# Patient Record
Sex: MALE | Race: WHITE | URBAN - METROPOLITAN AREA
[De-identification: names, ages, dates, MRNs, and addresses within clinical notes are randomized per-mention and may not be internally consistent; named-entity substitution may affect disease eponyms.]

---

## 2023-04-24 ENCOUNTER — TELEPHONE (OUTPATIENT)
Dept: PRIMARY CARE | Facility: CLINIC | Age: 63
End: 2023-04-24
Payer: COMMERCIAL

## 2023-04-27 ENCOUNTER — TELEMEDICINE (OUTPATIENT)
Dept: PRIMARY CARE | Facility: CLINIC | Age: 63
End: 2023-04-27
Payer: COMMERCIAL

## 2023-04-27 DIAGNOSIS — I10 BENIGN ESSENTIAL HYPERTENSION: ICD-10-CM

## 2023-04-27 DIAGNOSIS — K04.7 TOOTH INFECTION: ICD-10-CM

## 2023-04-27 DIAGNOSIS — E66.09 OBESITY DUE TO EXCESS CALORIES WITHOUT SERIOUS COMORBIDITY, UNSPECIFIED CLASSIFICATION: ICD-10-CM

## 2023-04-27 DIAGNOSIS — I51.9 HEART DISEASE: Primary | ICD-10-CM

## 2023-04-27 PROCEDURE — 99212 OFFICE O/P EST SF 10 MIN: CPT | Performed by: INTERNAL MEDICINE

## 2023-04-27 RX ORDER — AMOXICILLIN 500 MG/1
500 CAPSULE ORAL EVERY 8 HOURS SCHEDULED
Qty: 30 CAPSULE | Refills: 0 | Status: SHIPPED | OUTPATIENT
Start: 2023-04-27 | End: 2023-05-07

## 2023-04-27 RX ORDER — AMOXICILLIN 500 MG/1
500 CAPSULE ORAL EVERY 8 HOURS SCHEDULED
Qty: 30 CAPSULE | Refills: 0 | Status: SHIPPED | OUTPATIENT
Start: 2023-04-27 | End: 2023-04-27 | Stop reason: SDUPTHER

## 2023-04-27 NOTE — PROGRESS NOTES
Patient ID: Reji Bolanos is a 62 y.o. male who presents for No chief complaint on file..    Assessment/Plan     Problem List Items Addressed This Visit    None      Source of history: Nurse, Medical personnel, Medical record, Patient.  History limitation: None.      HPI  62-year-old patient have a heart disease going for dental appointment very about infection sepsis bacteremia advised salt water gargle amoxicillin before and after dental procedure    Negative for chest pain headache    Negative for nausea vomiting diarrhea constipation  Not on File    Medications    No current outpatient medications on file.     No current facility-administered medications for this visit.       Objective   Visit Vitals  Smoking Status Never       .Doxy  This visit was completed via video audio relation to  covid 19 pandemic all issues as below that discuss and address but no physical exam was performed if it was felt that patient should be evaluated in clinic and they have been advised to follow . Patient verbally consented to visit and spent  more than 50% discuss about patient's complaint of problem and plan        ROS  Constitutional: Denies fevers, chills, fatigue, weight loss/gain  HEENT: Denies HA, vision changes, hearing loss, sore throat  Cardiac: Denies CP, palpitations, edema  Respiratory: Denies SOB, cough, pleuritic chest pain, PND, orthopnea  GI: Denies N/V/D, abd pain, constipation, black/bloody stools  : Denies urinary changes, frequency, hematuria, urgency, retention, flank pain  MSK: Denies joint pain, joint swelling, back pain, neck pain, extremity pain  Neuro: Denies numbness, weakness, tingling    Immunization History   Administered Date(s) Administered   • Pfizer Purple Cap SARS-CoV-2 11/02/2021, 11/26/2021       No visits with results within 4 Month(s) from this visit.   Latest known visit with results is:   Legacy Encounter on 10/29/2022   Component Date Value Ref Range Status   • SARS-COV-2 RESULT  10/29/2022 NOT DETECTED  Not Detected Final       Radiology: Reviewed imaging in powerchart.  No results found.    No family history on file.  Social History     Socioeconomic History   • Marital status:      Spouse name: None   • Number of children: None   • Years of education: None   • Highest education level: None   Occupational History   • None   Tobacco Use   • Smoking status: Never   • Smokeless tobacco: Never   Vaping Use   • Vaping status: None   Substance and Sexual Activity   • Alcohol use: Not Currently   • Drug use: Not Currently   • Sexual activity: None   Other Topics Concern   • None   Social History Narrative   • None     Social Determinants of Health     Financial Resource Strain: Not on file   Food Insecurity: Not on file   Transportation Needs: Not on file   Physical Activity: Not on file   Stress: Not on file   Social Connections: Not on file   Intimate Partner Violence: Not on file   Housing Stability: Not on file     Past Medical History:   Diagnosis Date   • Acute upper respiratory infection, unspecified 10/29/2022    Viral URI with cough   • Allergic rhinitis, unspecified 01/17/2020    Allergic sinusitis   • Cellulitis of left lower limb 06/06/2017    Cellulitis of left leg without foot   • Impacted cerumen, unspecified ear 06/26/2014    Wax in ear   • Other conditions influencing health status 01/17/2020    History of cough   • Personal history of diseases of the skin and subcutaneous tissue 01/17/2020    History of eczema   • Personal history of other diseases of the circulatory system 11/30/2016    History of hypertension   • Personal history of other endocrine, nutritional and metabolic disease 11/11/2021    History of obesity   • Personal history of other endocrine, nutritional and metabolic disease 01/17/2020    History of vitamin D deficiency   • Personal history of other specified conditions     History of fever     Past Surgical History:   Procedure Laterality Date   • OTHER  SURGICAL HISTORY  06/26/2014    Heart Valve Replacement     * Cannot find OR log *    Charting was completed using voice recognition technology and may include unintended errors.

## 2023-05-04 ENCOUNTER — APPOINTMENT (OUTPATIENT)
Dept: PRIMARY CARE | Facility: CLINIC | Age: 63
End: 2023-05-04
Payer: COMMERCIAL

## 2023-06-21 ENCOUNTER — TELEPHONE (OUTPATIENT)
Dept: PRIMARY CARE | Facility: CLINIC | Age: 63
End: 2023-06-21
Payer: COMMERCIAL

## 2023-06-21 DIAGNOSIS — I10 BENIGN ESSENTIAL HYPERTENSION: ICD-10-CM

## 2023-06-22 RX ORDER — BENAZEPRIL HYDROCHLORIDE AND HYDROCHLOROTHIAZIDE 10; 12.5 MG/1; MG/1
1 TABLET ORAL
Qty: 30 TABLET | Refills: 0 | Status: SHIPPED | OUTPATIENT
Start: 2023-06-22 | End: 2023-08-11 | Stop reason: SDUPTHER

## 2023-08-11 ENCOUNTER — TELEPHONE (OUTPATIENT)
Dept: PRIMARY CARE | Facility: CLINIC | Age: 63
End: 2023-08-11
Payer: COMMERCIAL

## 2023-08-11 DIAGNOSIS — I10 BENIGN ESSENTIAL HYPERTENSION: ICD-10-CM

## 2023-08-11 RX ORDER — BENAZEPRIL HYDROCHLORIDE AND HYDROCHLOROTHIAZIDE 10; 12.5 MG/1; MG/1
1 TABLET ORAL
Qty: 30 TABLET | Refills: 0 | Status: SHIPPED | OUTPATIENT
Start: 2023-08-11 | End: 2023-11-16 | Stop reason: SDUPTHER

## 2023-11-16 DIAGNOSIS — I10 BENIGN ESSENTIAL HYPERTENSION: ICD-10-CM

## 2023-11-16 RX ORDER — BENAZEPRIL HYDROCHLORIDE AND HYDROCHLOROTHIAZIDE 10; 12.5 MG/1; MG/1
1 TABLET ORAL
Qty: 30 TABLET | Refills: 1 | Status: SHIPPED | OUTPATIENT
Start: 2023-11-16 | End: 2024-01-12 | Stop reason: SDUPTHER

## 2024-01-12 ENCOUNTER — OFFICE VISIT (OUTPATIENT)
Dept: PRIMARY CARE | Facility: CLINIC | Age: 64
End: 2024-01-12
Payer: COMMERCIAL

## 2024-01-12 VITALS
BODY MASS INDEX: 28.81 KG/M2 | DIASTOLIC BLOOD PRESSURE: 68 MMHG | HEART RATE: 84 BPM | OXYGEN SATURATION: 99 % | TEMPERATURE: 97.8 F | HEIGHT: 71 IN | SYSTOLIC BLOOD PRESSURE: 110 MMHG | WEIGHT: 205.8 LBS

## 2024-01-12 DIAGNOSIS — E66.09 OBESITY DUE TO EXCESS CALORIES WITHOUT SERIOUS COMORBIDITY, UNSPECIFIED CLASSIFICATION: ICD-10-CM

## 2024-01-12 DIAGNOSIS — I83.891 VARICOSE VEINS OF RIGHT LEG WITH EDEMA: ICD-10-CM

## 2024-01-12 DIAGNOSIS — Z00.00 MEDICARE ANNUAL WELLNESS VISIT, SUBSEQUENT: Primary | ICD-10-CM

## 2024-01-12 DIAGNOSIS — I10 BENIGN ESSENTIAL HYPERTENSION: ICD-10-CM

## 2024-01-12 DIAGNOSIS — I10 HYPERTENSION, UNSPECIFIED TYPE: ICD-10-CM

## 2024-01-12 DIAGNOSIS — I51.9 HEART DISEASE: ICD-10-CM

## 2024-01-12 DIAGNOSIS — K04.7 TOOTH INFECTION: ICD-10-CM

## 2024-01-12 LAB
NON-UH HIE A/G RATIO: 1.2
NON-UH HIE ALB: 4.1 G/DL (ref 3.4–5)
NON-UH HIE ALK PHOS: 81 UNIT/L (ref 45–117)
NON-UH HIE APPEARANCE, U: ABNORMAL
NON-UH HIE BACTERIA, U: ABNORMAL
NON-UH HIE BASO COUNT: 0.04 X1000 (ref 0–0.2)
NON-UH HIE BASOS %: 0.6 %
NON-UH HIE BILIRUBIN, TOTAL: 0.7 MG/DL (ref 0.3–1.2)
NON-UH HIE BILIRUBIN, U: NEGATIVE
NON-UH HIE BLOOD, U: NEGATIVE
NON-UH HIE BUN/CREAT RATIO: 22
NON-UH HIE BUN: 22 MG/DL (ref 9–23)
NON-UH HIE CALCIUM: 9.8 MG/DL (ref 8.7–10.4)
NON-UH HIE CALCULATED LDL CHOLESTEROL: 84 MG/DL (ref 60–130)
NON-UH HIE CALCULATED OSMOLALITY: 275 MOSM/KG (ref 275–295)
NON-UH HIE CHLORIDE: 102 MMOL/L (ref 98–107)
NON-UH HIE CHOLESTEROL: 151 MG/DL (ref 100–200)
NON-UH HIE CO2, VENOUS: 27 MMOL/L (ref 20–31)
NON-UH HIE COLOR, U: YELLOW
NON-UH HIE CREATININE, URINE MG/DL: 163.1 MG/DL
NON-UH HIE CREATININE: 1 MG/DL (ref 0.6–1.1)
NON-UH HIE DIFF?: NO
NON-UH HIE DXH ACTIONS: ABNORMAL
NON-UH HIE EOS COUNT: 0.29 X1000 (ref 0–0.5)
NON-UH HIE EOSIN %: 4.1 %
NON-UH HIE GFR AA: >60
NON-UH HIE GLOBULIN: 3.4 G/DL
NON-UH HIE GLOMERULAR FILTRATION RATE: >60 ML/MIN/1.73M?
NON-UH HIE GLUCOSE QUAL, U: NEGATIVE
NON-UH HIE GLUCOSE: 97 MG/DL (ref 74–106)
NON-UH HIE GOT: 23 UNIT/L (ref 15–37)
NON-UH HIE GPT: 15 UNIT/L (ref 10–49)
NON-UH HIE HCT: 37 % (ref 41–52)
NON-UH HIE HDL CHOLESTEROL: 55 MG/DL (ref 40–60)
NON-UH HIE HEM PATH REVIEW: ABNORMAL
NON-UH HIE HEPATITIS C ANTIBODY: NONREACTIVE
NON-UH HIE HGB A1C: 5.5 %
NON-UH HIE HGB: 11.2 G/DL (ref 13.5–17.5)
NON-UH HIE HIV  PANEL 1: NONREACTIVE
NON-UH HIE INSTR WBC: 7.2
NON-UH HIE K: 4.5 MMOL/L (ref 3.5–5.1)
NON-UH HIE KETONES, U: NEGATIVE
NON-UH HIE LEUKOCYTE ESTERASE, U: ABNORMAL
NON-UH HIE LYMPH %: 17.6 %
NON-UH HIE LYMPH COUNT: 1.26 X1000 (ref 1.2–4.8)
NON-UH HIE MAGNESIUM: 2 MG/DL (ref 1.6–2.6)
NON-UH HIE MCH: 18.9 PG (ref 27–34)
NON-UH HIE MCHC: 30.2 G/DL (ref 32–37)
NON-UH HIE MCV: 62.6 FL (ref 80–100)
NON-UH HIE MICROALBUMIN, URINE MG/L: <3 MG/L
NON-UH HIE MICROALBUMIN/CREATININE RATIO: <2 MG MALB/GM CREAT (ref 0–30)
NON-UH HIE MONO %: 8.4 %
NON-UH HIE MONO COUNT: 0.6 X1000 (ref 0.1–1)
NON-UH HIE MPV: 8.5 FL (ref 7.4–10.4)
NON-UH HIE MUCOUS, U: ABNORMAL
NON-UH HIE NA: 136 MMOL/L (ref 135–145)
NON-UH HIE NEUTROPHIL %: 69.3 %
NON-UH HIE NEUTROPHIL COUNT (ANC): 4.98 X1000 (ref 1.4–8.8)
NON-UH HIE NITRITE, U: NEGATIVE
NON-UH HIE NUCLEATED RBC: 0 /100WBC
NON-UH HIE PH, U: 7 (ref 4.5–8)
NON-UH HIE PLATELET: 358 X10 (ref 150–450)
NON-UH HIE PROSTATIC SPECIFIC AG SCREEN: 4.7 NG/ML (ref 0–4)
NON-UH HIE PROTEIN, U: NEGATIVE
NON-UH HIE RBC/HPF, U: 1 #/HPF (ref 0–3)
NON-UH HIE RBC: 5.91 X10 (ref 4.7–6.1)
NON-UH HIE RDW: 22.2 % (ref 11.5–14.5)
NON-UH HIE RED BLOOD CELL MORPHOLOGY: ABNORMAL
NON-UH HIE SCAN DIFFERENTIAL: ABNORMAL
NON-UH HIE SPECIFIC GRAVITY, U: 1.02 (ref 1–1.03)
NON-UH HIE SQUAMOUS EPITHELIAL CELLS, U: 4 #/HPF
NON-UH HIE TOTAL CHOL/HDL CHOL RATIO: 2.7
NON-UH HIE TOTAL PROTEIN: 7.5 G/DL (ref 5.7–8.2)
NON-UH HIE TRIGLYCERIDES: 62 MG/DL (ref 30–150)
NON-UH HIE TSH: 1.34 UIU/ML (ref 0.55–4.78)
NON-UH HIE U MICRO: ABNORMAL
NON-UH HIE URIC ACID: 7.1 MG/DL (ref 3.7–9.2)
NON-UH HIE UROBILINOGEN QUAL, U: ABNORMAL
NON-UH HIE WBC/HPF, U: 5 #/HPF (ref 0–5)
NON-UH HIE WBC: 7.2 X10 (ref 4.5–11)

## 2024-01-12 PROCEDURE — 99396 PREV VISIT EST AGE 40-64: CPT | Performed by: INTERNAL MEDICINE

## 2024-01-12 PROCEDURE — 3078F DIAST BP <80 MM HG: CPT | Performed by: INTERNAL MEDICINE

## 2024-01-12 PROCEDURE — 3074F SYST BP LT 130 MM HG: CPT | Performed by: INTERNAL MEDICINE

## 2024-01-12 PROCEDURE — 1036F TOBACCO NON-USER: CPT | Performed by: INTERNAL MEDICINE

## 2024-01-12 PROCEDURE — 96127 BRIEF EMOTIONAL/BEHAV ASSMT: CPT | Performed by: INTERNAL MEDICINE

## 2024-01-12 RX ORDER — CHOLECALCIFEROL (VITAMIN D3) 25 MCG
1000 TABLET ORAL
COMMUNITY
Start: 2016-11-30

## 2024-01-12 RX ORDER — BENAZEPRIL HYDROCHLORIDE AND HYDROCHLOROTHIAZIDE 10; 12.5 MG/1; MG/1
1 TABLET ORAL
Qty: 90 TABLET | Refills: 3 | Status: SHIPPED | OUTPATIENT
Start: 2024-01-12 | End: 2024-04-30 | Stop reason: SDUPTHER

## 2024-01-12 RX ORDER — ASPIRIN 81 MG/1
TABLET ORAL
COMMUNITY
Start: 2017-11-13

## 2024-01-12 ASSESSMENT — PATIENT HEALTH QUESTIONNAIRE - PHQ9
2. FEELING DOWN, DEPRESSED OR HOPELESS: NOT AT ALL
1. LITTLE INTEREST OR PLEASURE IN DOING THINGS: NOT AT ALL
SUM OF ALL RESPONSES TO PHQ9 QUESTIONS 1 AND 2: 0

## 2024-01-12 NOTE — PROGRESS NOTES
Assessment and Plan:  Obesity BMI 28 diet and exercise  Hypertension lisinopril 10/12.5 BMP once a year  Hyperlipidemia low-fat diet  Osteopenia osteoarthritis vitamin C vitamin D calcium supplement  Problem List Items Addressed This Visit       RESOLVED: Tooth infection    Relevant Orders    HIV 1/2 Antigen/Antibody Screen with Reflex to Confirmation    Hepatitis C antibody    Albumin , Urine Random    CBC and Auto Differential    Comprehensive Metabolic Panel    Hemoglobin A1C    Lipid Panel    Magnesium    Prostate Specific Antigen, Screen    TSH with reflex to Free T4 if abnormal    Uric Acid    Microscopic Only, Urine    Colonoscopy Screening; Average Risk Patient    Heart disease    Relevant Orders    HIV 1/2 Antigen/Antibody Screen with Reflex to Confirmation    Hepatitis C antibody    Albumin , Urine Random    CBC and Auto Differential    Comprehensive Metabolic Panel    Hemoglobin A1C    Lipid Panel    Magnesium    Prostate Specific Antigen, Screen    TSH with reflex to Free T4 if abnormal    Uric Acid    Microscopic Only, Urine    Colonoscopy Screening; Average Risk Patient    Benign essential hypertension     Patients BP readings reviewed and addressed, as we age our arteries turn stiffer and less elastic. Restricting salt consumption and staying physically fit with regular exercise regimen is the only way to keep our vasculature less tonic. Studies have shown that keeping ideal body wt, exercise routine about 140 to 150 minutes a week, eating variety of plant based diet and drinking plentiful water are quite helpful. Monitor BP twice or once a week at home and bring log to be reviewed by me. Uncontrolled BP has long term consequences including heart failure, myocardial infarction, accelerated atherosclerosis and kidney dysfunction. Therapy reviewed and explained.           Relevant Medications    benazepriL-hydrochlorothiazide (Lotensin HCT) 10-12.5 mg tablet    Other Relevant Orders    HIV 1/2  Antigen/Antibody Screen with Reflex to Confirmation    Hepatitis C antibody    Albumin , Urine Random    CBC and Auto Differential    Comprehensive Metabolic Panel    Hemoglobin A1C    Lipid Panel    Magnesium    Prostate Specific Antigen, Screen    TSH with reflex to Free T4 if abnormal    Uric Acid    Microscopic Only, Urine    Colonoscopy Screening; Average Risk Patient    Obesity due to excess calories without serious comorbidity    Relevant Orders    HIV 1/2 Antigen/Antibody Screen with Reflex to Confirmation    Hepatitis C antibody    Albumin , Urine Random    CBC and Auto Differential    Comprehensive Metabolic Panel    Hemoglobin A1C    Lipid Panel    Magnesium    Prostate Specific Antigen, Screen    TSH with reflex to Free T4 if abnormal    Uric Acid    Microscopic Only, Urine    Colonoscopy Screening; Average Risk Patient    Medicare annual wellness visit, subsequent - Primary    Relevant Orders    HIV 1/2 Antigen/Antibody Screen with Reflex to Confirmation    Hepatitis C antibody    Albumin , Urine Random    CBC and Auto Differential    Comprehensive Metabolic Panel    Hemoglobin A1C    Lipid Panel    Magnesium    Prostate Specific Antigen, Screen    TSH with reflex to Free T4 if abnormal    Uric Acid    Microscopic Only, Urine    Colonoscopy Screening; Average Risk Patient    Varicose veins of right leg with edema     Other Visit Diagnoses       Hypertension, unspecified type        Relevant Orders    HIV 1/2 Antigen/Antibody Screen with Reflex to Confirmation    Hepatitis C antibody    Albumin , Urine Random    CBC and Auto Differential    Comprehensive Metabolic Panel    Hemoglobin A1C    Lipid Panel    Magnesium    Prostate Specific Antigen, Screen    TSH with reflex to Free T4 if abnormal    Uric Acid    Microscopic Only, Urine    CT cardiac scoring wo IV contrast    Colonoscopy Screening; Average Risk Patient              Chief Complaint:   Annual Medicare Wellness Office Exam/Comprehensive Problem  Focused Follow Up and Physical Exam      HPI: 63-year-old patient  with no children    1 brother no sisters    Mother have end-stage renal disease    Father of heart attack    Personal history of the right lower extremity varicose vein with phlebitis    Father had prostate cancer    Negative for tobacco well-controlled    Personal history of varicose vein hypertension hyperlipidemia obesity    Negative for headache chest pain hematuria rectal bleeding or fall or COVID or suicide        Patient Active Problem List:  Patient Active Problem List   Diagnosis    Heart disease    Benign essential hypertension    Obesity due to excess calories without serious comorbidity    Medicare annual wellness visit, subsequent    Varicose veins of right leg with edema          Comprehensive Medical/Surgical/Social/Family History:  Family History   Problem Relation Name Age of Onset    Kidney disease Mother      Prostate cancer Father         Past Medical History:   Diagnosis Date    Acute upper respiratory infection, unspecified 10/29/2022    Viral URI with cough    Allergic rhinitis, unspecified 01/17/2020    Allergic sinusitis    Cellulitis of left lower limb 06/06/2017    Cellulitis of left leg without foot    Impacted cerumen, unspecified ear 06/26/2014    Wax in ear    Other conditions influencing health status 01/17/2020    History of cough    Personal history of diseases of the skin and subcutaneous tissue 01/17/2020    History of eczema    Personal history of other diseases of the circulatory system 11/30/2016    History of hypertension    Personal history of other endocrine, nutritional and metabolic disease 11/11/2021    History of obesity    Personal history of other endocrine, nutritional and metabolic disease 01/17/2020    History of vitamin D deficiency    Personal history of other specified conditions     History of fever       Past Surgical History:   Procedure Laterality Date    OTHER SURGICAL HISTORY   06/26/2014    Heart Valve Replacement       Social History     Socioeconomic History    Marital status:      Spouse name: None    Number of children: None    Years of education: None    Highest education level: None   Occupational History    None   Tobacco Use    Smoking status: Never    Smokeless tobacco: Never   Substance and Sexual Activity    Alcohol use: Not Currently    Drug use: Not Currently    Sexual activity: None   Other Topics Concern    None   Social History Narrative    None     Social Determinants of Health     Financial Resource Strain: Not on file   Food Insecurity: Not on file   Transportation Needs: Not on file   Physical Activity: Not on file   Stress: Not on file   Social Connections: Not on file   Intimate Partner Violence: Not on file   Housing Stability: Not on file       Tobacco/Alcohol/Opioid use, as well as Illicit Drug Use was screened for/reviewed and documented in Social Documentation section of the chart and medication list as appropriate    Allergies and Medications  No Known Allergies  Current Outpatient Medications   Medication Sig Dispense Refill    aspirin 81 mg EC tablet Take by mouth.      cholecalciferol (Vitamin D-3) 25 MCG (1000 UT) tablet Take 1 tablet (1,000 Units) by mouth.      benazepriL-hydrochlorothiazide (Lotensin HCT) 10-12.5 mg tablet Take 1 tablet by mouth once daily in the morning. Take before meals. 90 tablet 3     No current facility-administered medications for this visit.       Medications and Supplements  prescribed by me and other practitioners or clinical pharmacist (such as prescriptions, OTC's, herbal therapies and supplements) were reviewed and documented in the medical record.     Advance directives  Advanced Care Planning (including a Living Will, Healthcare POA, as well as specific end of life choices and/or directives), was discussed for approximately 16 minutes with the patient and/or surrogate, voluntarily, and documented in the Problem List  "of the medical record.     Cardiac Risk Assessment  Cardiovascular risk was discussed and, if needed, lifestyle modifications recommended, including nutritional choices, exercise, and elimination of habits contributing to risk. We agreed on a plan to reduce the current cardiovascular risk based on above discussion as needed.  Aspirin use/disuse was discussed and documented in the Problem List of the medical record after reviewing the updated guidelines below:    Consider low dose Aspirin ( mg) use if the benefit for cardiovascular disease prevention outweighs risk for bleeding complications.   In general, low dose ASA should be considered:  In patients WITHOUT prior MI/stroke/PAD (primary prevention):   a. Age <60: Use if 10-year cardiovascular disease risk >20%, with discussion of risks and benefits with patient  b. Age 60-<70: Use if 10-year cardiovascular disease risk >20% and low bleeding (e.g., gastrointenstinal) risk, with discussion of risks and benefits with patient  c. Age >=70: Do not use    In patients WITH prior MI/stroke/PAD (secondary prevention):   Generally use unless extremely high bleeding (e.g., gastrointenstinal) risk, with discussion of risks and benefits with patient    ROS otherwise negative aside from what was mentioned above in HPI.    Visit Vitals  /68 (BP Location: Left arm, Patient Position: Sitting, BP Cuff Size: Adult)   Pulse 84   Temp 36.6 °C (97.8 °F)   Ht 1.803 m (5' 11\")   Wt 93.4 kg (205 lb 12.8 oz)   SpO2 99%   BMI 28.70 kg/m²   Smoking Status Never   BSA 2.16 m²       Physical Exam  Physical Exam:  BMI 28  Appearance: Alert, oriented , cooperative,  in no acute distress. Well nourished & well hydrated.    Skin: Phlebitis varicose vein right legs   eyes: Eyeglasses.     ENT: Hearing grossly intact. External auditory canals patent, tympanic membranes intact with visible landmarks. Nares patent, mucus membranes moist. Dentition without lesions. Pharynx clear, uvula " midline.     Neck: Supple, without meningismus. Thyroid not palpable. Trachea at midline. No lymphadenopathy.    Pulmonary: Clear bilaterally with good chest wall excursion. No rales, rhonchi or wheezing. No accessory muscle use or stridor.    Cardiac: Normal S1, S2 without murmur, rub, gallop or extrasystole. No JVD, Carotids without bruits.    Abdomen: Soft, nontender, active bowel sounds.  No palpable organomegaly.  No rebound or guarding.  No CVA tenderness.    Genitourinary: Exam deferred.    Musculoskeletal: Arthritis of right knee    Neurological:  Cranial nerves II through XII are grossly intact, finger-nose touch is normal, normal sensation, no weakness, no focal findings identified.    Psychiatric: Appropriate mood and affect.         During the course of the visit the patient was educated and counseled about age appropriate screening and preventive services. Completed preventive screenings were documented in the chart and orders were placed for outstanding screenings/procedures as documented in the Assessment and Plan.    Patient Instructions (the written plan) was given to the patient at check out.    Charting was completed using voice recognition technology and may include unintended errors.

## 2024-01-15 ENCOUNTER — TELEPHONE (OUTPATIENT)
Dept: PRIMARY CARE | Facility: CLINIC | Age: 64
End: 2024-01-15
Payer: COMMERCIAL

## 2024-01-15 DIAGNOSIS — N39.0 URINARY TRACT INFECTION WITHOUT HEMATURIA, SITE UNSPECIFIED: ICD-10-CM

## 2024-01-15 LAB — NON-UH HIE DIFF REVIEW INTERP: NORMAL

## 2024-01-15 RX ORDER — CIPROFLOXACIN 500 MG/1
500 TABLET ORAL 2 TIMES DAILY
Qty: 20 TABLET | Refills: 0 | Status: SHIPPED | OUTPATIENT
Start: 2024-01-15 | End: 2024-01-25

## 2024-01-15 NOTE — TELEPHONE ENCOUNTER
----- Message from Thuy Nayak MD sent at 1/15/2024  9:43 AM EST -----  UTI anemia high PSA advised Cipro 500 twice a day for 10 days follow-up with urology Dr. Smith

## 2024-04-30 DIAGNOSIS — I10 BENIGN ESSENTIAL HYPERTENSION: ICD-10-CM

## 2024-04-30 RX ORDER — BENAZEPRIL HYDROCHLORIDE AND HYDROCHLOROTHIAZIDE 10; 12.5 MG/1; MG/1
1 TABLET ORAL
Qty: 90 TABLET | Refills: 3 | Status: SHIPPED | OUTPATIENT
Start: 2024-04-30

## 2025-05-13 DIAGNOSIS — I10 BENIGN ESSENTIAL HYPERTENSION: ICD-10-CM

## 2025-05-13 RX ORDER — BENAZEPRIL HYDROCHLORIDE AND HYDROCHLOROTHIAZIDE 10; 12.5 MG/1; MG/1
1 TABLET ORAL
Qty: 30 TABLET | Refills: 0 | Status: SHIPPED | OUTPATIENT
Start: 2025-05-13